# Patient Record
Sex: MALE | Race: WHITE | NOT HISPANIC OR LATINO | Employment: OTHER | URBAN - METROPOLITAN AREA
[De-identification: names, ages, dates, MRNs, and addresses within clinical notes are randomized per-mention and may not be internally consistent; named-entity substitution may affect disease eponyms.]

---

## 2021-08-16 ENCOUNTER — TRANSFERRED RECORDS (OUTPATIENT)
Dept: HEALTH INFORMATION MANAGEMENT | Facility: CLINIC | Age: 46
End: 2021-08-16

## 2023-10-18 ENCOUNTER — TRANSFERRED RECORDS (OUTPATIENT)
Dept: HEALTH INFORMATION MANAGEMENT | Facility: CLINIC | Age: 48
End: 2023-10-18

## 2023-10-18 LAB
ALT SERPL-CCNC: 23 U/L
AST SERPL-CCNC: 55 U/L (ref 17–59)
CREATININE (EXTERNAL): 1.2 MG/DL (ref 0.8–1.3)
GFR ESTIMATED (EXTERNAL): 75 ML/MIN/1.73M2 (ref 60–130)
GLUCOSE (EXTERNAL): 95 MG/DL (ref 70–99)
POTASSIUM (EXTERNAL): 5.2 MMOL/L (ref 3.5–5.1)

## 2023-10-19 ENCOUNTER — MEDICAL CORRESPONDENCE (OUTPATIENT)
Dept: HEALTH INFORMATION MANAGEMENT | Facility: CLINIC | Age: 48
End: 2023-10-19

## 2023-10-24 ENCOUNTER — TRANSCRIBE ORDERS (OUTPATIENT)
Dept: OTHER | Age: 48
End: 2023-10-24

## 2023-10-24 DIAGNOSIS — R74.8 ABNORMAL LEVELS OF OTHER SERUM ENZYMES: ICD-10-CM

## 2023-10-24 DIAGNOSIS — K70.30 ALCOHOLIC CIRRHOSIS OF LIVER WITHOUT ASCITES (H): Primary | ICD-10-CM

## 2023-11-16 NOTE — CONFIDENTIAL NOTE
DIAGNOSIS  Alcoholic cirrhosis of liver without ascites (H)   Abnormal levels of other serum enzymes   Splenomegaly      Appt Date:  02.02.2024    NOTES STATUS DETAILS   OFFICE NOTE from referring provider Received 10.25.2023 Handy Foster MD [facility]   OFFICE NOTES from other specialists     DISCHARGE SUMMARY from hospital Care Everywhere 08.14.2023 [facility]    MEDICATION LIST Received    LIVER BIOSPY (IF APPLICABLE)      PATHOLOGY REPORTS      IMAGING     ENDOSCOPY (IF AVAILABLE) Received    COLONOSCOPY (IF AVAILABLE) Received    ULTRASOUND LIVER Care Everywhere 10.30.2023 US Abd Limited    09.21.2023, 09.08.2023, 09.01.2023, 08.25.2023  US Paracentesis      08.15.2023 US Abd Doppler Complete    08.14.2023 US Abd RUQ   CT OF ABDOMEN     MRI OF LIVER Care Everywhere 08.15.2023 MR Abd    FIBROSCAN, US ELASTOGRAPHY, FIBROSIS SCAN, MR ELASTOGRAPHY     LABS     HEPATIC PANEL (LIVER PANEL) Care Everywhere 08.14.2023   BASIC METABOLIC PANEL Care Everywhere 08.18.2023   COMPLETE METABOLIC PANEL Care Everywhere 08.18.2023   COMPLETE BLOOD COUNT (CBC) Care Everywhere 08.18.2023   INTERNATIONAL NORMALIZED RATIO (INR) Care Everywhere 08.15.2023   HEPATITIS C ANTIBODY Care Everywhere 08.14.2023   HEPATITIS C VIRAL LOAD/PCR     HEPATITIS C GENOTYPE     HEPATITIS B SURFACE ANTIGEN Care Everywhere 08.14.2023   HEPATITIS B SURFACE ANTIBODY Care Everywhere 08.14.2023   HEPATITIS B DNA QUANT LEVEL     HEPATITIS B CORE ANTIBODY        Action 11.16.2023 RM   Action Taken Sent fax request for images pending     Action 12.18.2023 RM   Action Taken Images received and uploaded to chart.

## 2024-01-19 ENCOUNTER — TELEPHONE (OUTPATIENT)
Dept: GASTROENTEROLOGY | Facility: CLINIC | Age: 49
End: 2024-01-19
Payer: COMMERCIAL

## 2024-01-19 DIAGNOSIS — K70.31 ALCOHOLIC CIRRHOSIS OF LIVER WITH ASCITES (H): Primary | ICD-10-CM

## 2024-01-19 NOTE — TELEPHONE ENCOUNTER
Lab orders faxed to [facility], per patient request.    Flores RIVERA LPN  Hepatology Clinic     [facility]    Phone Message    May a detailed message be left on voicemail: yes     Reason for Call: Order(s): Other:   Reason for requested: Liver Labs  Date needed: ASAP  Provider name: Dr Sujatha Kunz    Please fax lab orders to [facility] to fax number [phone].     Action Taken: Message routed to:  [facility] Hep    Travel Screening: Not Applicable

## 2024-01-25 ENCOUNTER — TRANSFERRED RECORDS (OUTPATIENT)
Dept: HEALTH INFORMATION MANAGEMENT | Facility: CLINIC | Age: 49
End: 2024-01-25
Payer: COMMERCIAL

## 2024-01-25 LAB
ALT SERPL-CCNC: 21 U/L (ref 4–33)
AST SERPL-CCNC: 24 U/L (ref 13–39)
CREATININE (EXTERNAL): 1.4 MG/DL (ref 0.7–1.3)
GFR ESTIMATED (EXTERNAL): 62 ML/MIN/1.73M2 (ref 60–130)
GLUCOSE (EXTERNAL): 91 MG/DL (ref 70–99)
POTASSIUM (EXTERNAL): 4.9 MMOL/L (ref 3.5–5.1)

## 2024-02-02 ENCOUNTER — VIRTUAL VISIT (OUTPATIENT)
Dept: GASTROENTEROLOGY | Facility: CLINIC | Age: 49
End: 2024-02-02
Attending: STUDENT IN AN ORGANIZED HEALTH CARE EDUCATION/TRAINING PROGRAM
Payer: COMMERCIAL

## 2024-02-02 ENCOUNTER — PRE VISIT (OUTPATIENT)
Dept: GASTROENTEROLOGY | Facility: CLINIC | Age: 49
End: 2024-02-02
Payer: COMMERCIAL

## 2024-02-02 DIAGNOSIS — K70.30 ALCOHOLIC CIRRHOSIS OF LIVER WITHOUT ASCITES (H): ICD-10-CM

## 2024-02-02 DIAGNOSIS — R74.8 ABNORMAL LEVELS OF OTHER SERUM ENZYMES: ICD-10-CM

## 2024-02-02 PROCEDURE — 99205 OFFICE O/P NEW HI 60 MIN: CPT | Mod: 95 | Performed by: INTERNAL MEDICINE

## 2024-02-02 NOTE — PROGRESS NOTES
REASON FOR CONSULTATION: alcohol related cirrhosis  REFERRING PROVIDER: Dr. Handy Foster [facility].    A/P  Tesfaye Amin is a 48 year old male with decompensated alcohol related liver cirrhosis c/b ascites.    Since his initial presentation August 2023 and stopping alcohol just before that, he has improved. TB peak was 2.4, now 0.8, ascites is controlled on furosemide 40    Ascites controlled with furosemide 40. Stopped lyndsey 100 a month ago and no re accumulation. Follows low Na diet. Will get US. Ordered.  HCC screening Due. Ordered.  EV Screening Unclear if done. Will get [place] records.  Portal hypertension as manifest by thrombocytopenia, ascites     Thrombocytopenia mild, plt 126. 2/2 cirrhosis and alcohol    Tobacco use Recommended he quit. He is cutting back  Dental status Has recently seen dentist  Nutritional status Good  AUD No alcohol since July 2023. Discussed recommendation for abstinence and importance of sobriety and congratulated him on his success.    ADDENDUM  Colonoscopy and EGD reports received. 7/9/21  EGD no varices  Colonoscopy 4 polyps    RTC with DYLAN in 3-4 mo    Sujatha Kunz MD  Hepatology/Liver Transplant  Medical Director, Liver Transplantation  [facility]  Subjective  Tesfaye Amin is a 48 year old male referred for alcohol cirrhosis. Diagnosis was made August 2023 when he was admitted at [facility] for 2 mo of abdominal distention, decreased appetite and abd pn. He stopped drinking just before that.    He has not had any liver care since the hospitalization    Notes state he had ascites in about 2020 and was seen in [place]. He moved to [place] and had no further follow up.     States his fingernails are falling apart, hair is falling out, has lost muscle mass    Thinks he may feel more full than in the past but unsure if this is fluid.. No imaging since September.    Labs January 25 at [place] which he read to me from his computer  Creat 1.4  Na 136  K  4.9  Bilirubin 0.8  AST 24  ALT 21  INR 1.2  Hgb 13.9  Plt  126  WBC 8.1    AUD  Last alcohol was July 2023  Alcohol pattern had been 12 beers/d then switched to liquor 500 ml/d when abd distention precluded that volume in beer.  CD treatment: had court ordered treatment for THC in urine around 2010    Ascites  He is following a low sodium diet and high protein diet.   No para needed since Septemeber. He is on furosemide 40 and was on spironolactone 100 abut it was stopped for feeling dehydrated around 1 mo ago. Made no difference    HCC screening MRI 8/14/23 limited by ascites  EV screening no record    Risk factors for fatty liver disease    Past Medical History  Chronic LBP on opiates oxycodone 10 mg 3-4 per day and  cyclobenzaprine  Tamazepam for sleep  Finger pain with swelling, takes ibuprofen  HTN on meds in the past  GERD on omeprazole  Social History  Lives with sister who is a nurse and he is helping raise his niece with her.  Smokes 1 ppd  Family History  Uncle with alcohol related cirrhosis    ROS 10 point ROS neg other than the symptoms noted above in the HPI.  Exam  Gen Alert pleasant NAD  Resp No difficulty breathing. No cough  Skin No Jaundice  Eyes No icterus  Neuro GHOTRA  MSK no muscle wasting  Psyche Pleasant, appropriate. Well groomed.  Tesfaye Amin is a 48 year old male who is being evaluated via a billable video visit.    Video-Visit Details  Type of service:  Video Visit  Video Start Time:  Video End Time:  Originating Location (pt. Location):home  Distant Location (provider location):  [facility] [place]      Platform used for Video Visit: Zoom or Stackdriver

## 2024-02-02 NOTE — PROGRESS NOTES
"Virtual Visit Details    Type of service:  Video Visit     Originating Location (pt. Location): {video visit patient location:209835::\"Home\"}  {PROVIDER LOCATION On-site should be selected for visits conducted from your clinic location or adjoining [facility] hospital, academic office, or other nearby [facility] building. Off-site should be selected for all other provider locations, including home:26[address]}  Distant Location (provider location):  {virtual location provider:755883}  Platform used for Video Visit: {Virtual Visit Platforms:598192::\"What the Trend\"}    "

## 2024-02-02 NOTE — NURSING NOTE
Is the patient currently in the state of [place]? YES    Visit mode:VIDEO    If the visit is dropped, the patient can be reconnected by: VIDEO VISIT: Text to cell phone:   Telephone Information:   Mobile [phone]       Will anyone else be joining the visit? NO  (If patient encounters technical issues they should call [phone]    How would you like to obtain your AVS? MyChart    Are changes needed to the allergy or medication list? No    Reason for visit: Consult    Tiffany WEISS

## 2024-02-06 ENCOUNTER — TELEPHONE (OUTPATIENT)
Dept: GASTROENTEROLOGY | Facility: CLINIC | Age: 49
End: 2024-02-06
Payer: COMMERCIAL

## 2024-02-08 DIAGNOSIS — K70.30 ALCOHOLIC CIRRHOSIS OF LIVER WITHOUT ASCITES (H): Primary | ICD-10-CM

## 2024-04-25 DIAGNOSIS — K70.31 ALCOHOLIC CIRRHOSIS OF LIVER WITH ASCITES (H): Primary | ICD-10-CM

## 2024-05-06 ENCOUNTER — LAB (OUTPATIENT)
Dept: LAB | Facility: CLINIC | Age: 49
End: 2024-05-06
Payer: COMMERCIAL

## 2024-05-06 ENCOUNTER — OFFICE VISIT (OUTPATIENT)
Dept: GASTROENTEROLOGY | Facility: CLINIC | Age: 49
End: 2024-05-06
Attending: INTERNAL MEDICINE
Payer: COMMERCIAL

## 2024-05-06 VITALS
HEART RATE: 73 BPM | SYSTOLIC BLOOD PRESSURE: 131 MMHG | WEIGHT: 235 LBS | OXYGEN SATURATION: 97 % | DIASTOLIC BLOOD PRESSURE: 87 MMHG

## 2024-05-06 DIAGNOSIS — K70.31 ALCOHOLIC CIRRHOSIS OF LIVER WITH ASCITES (H): ICD-10-CM

## 2024-05-06 DIAGNOSIS — M25.50 MULTIPLE JOINT PAIN: ICD-10-CM

## 2024-05-06 DIAGNOSIS — M25.50 MULTIPLE JOINT PAIN: Primary | ICD-10-CM

## 2024-05-06 DIAGNOSIS — K70.30 ALCOHOLIC CIRRHOSIS OF LIVER WITHOUT ASCITES (H): ICD-10-CM

## 2024-05-06 DIAGNOSIS — I85.10 SECONDARY ESOPHAGEAL VARICES WITHOUT BLEEDING (H): ICD-10-CM

## 2024-05-06 LAB
ALBUMIN SERPL BCG-MCNC: 4.4 G/DL (ref 3.5–5.2)
ALP SERPL-CCNC: 106 U/L (ref 40–150)
ALT SERPL W P-5'-P-CCNC: 21 U/L (ref 0–70)
ANION GAP SERPL CALCULATED.3IONS-SCNC: 12 MMOL/L (ref 7–15)
AST SERPL W P-5'-P-CCNC: 25 U/L (ref 0–45)
BILIRUB DIRECT SERPL-MCNC: 0.29 MG/DL (ref 0–0.3)
BILIRUB SERPL-MCNC: 0.7 MG/DL
BUN SERPL-MCNC: 39.9 MG/DL (ref 6–20)
CALCIUM SERPL-MCNC: 9.6 MG/DL (ref 8.6–10)
CHLORIDE SERPL-SCNC: 103 MMOL/L (ref 98–107)
CREAT SERPL-MCNC: 1.05 MG/DL (ref 0.67–1.17)
DEPRECATED HCO3 PLAS-SCNC: 24 MMOL/L (ref 22–29)
EGFRCR SERPLBLD CKD-EPI 2021: 87 ML/MIN/1.73M2
ERYTHROCYTE [DISTWIDTH] IN BLOOD BY AUTOMATED COUNT: 13.1 % (ref 10–15)
GLUCOSE SERPL-MCNC: 180 MG/DL (ref 70–99)
HCT VFR BLD AUTO: 42.2 % (ref 40–53)
HGB BLD-MCNC: 14.8 G/DL (ref 13.3–17.7)
INR PPP: 1.37 (ref 0.85–1.15)
MCH RBC QN AUTO: 30.7 PG (ref 26.5–33)
MCHC RBC AUTO-ENTMCNC: 35.1 G/DL (ref 31.5–36.5)
MCV RBC AUTO: 88 FL (ref 78–100)
PLATELET # BLD AUTO: 98 10E3/UL (ref 150–450)
POTASSIUM SERPL-SCNC: 4.1 MMOL/L (ref 3.4–5.3)
PROT SERPL-MCNC: 7.8 G/DL (ref 6.4–8.3)
RBC # BLD AUTO: 4.82 10E6/UL (ref 4.4–5.9)
SODIUM SERPL-SCNC: 139 MMOL/L (ref 135–145)
URATE SERPL-MCNC: 7 MG/DL (ref 3.4–7)
WBC # BLD AUTO: 6.8 10E3/UL (ref 4–11)

## 2024-05-06 PROCEDURE — 99213 OFFICE O/P EST LOW 20 MIN: CPT | Performed by: PHYSICIAN ASSISTANT

## 2024-05-06 PROCEDURE — 84550 ASSAY OF BLOOD/URIC ACID: CPT | Performed by: PATHOLOGY

## 2024-05-06 PROCEDURE — 80053 COMPREHEN METABOLIC PANEL: CPT | Performed by: PATHOLOGY

## 2024-05-06 PROCEDURE — 82248 BILIRUBIN DIRECT: CPT | Performed by: PATHOLOGY

## 2024-05-06 PROCEDURE — 85027 COMPLETE CBC AUTOMATED: CPT | Performed by: PATHOLOGY

## 2024-05-06 PROCEDURE — G2211 COMPLEX E/M VISIT ADD ON: HCPCS | Performed by: PHYSICIAN ASSISTANT

## 2024-05-06 PROCEDURE — 99214 OFFICE O/P EST MOD 30 MIN: CPT | Performed by: PHYSICIAN ASSISTANT

## 2024-05-06 PROCEDURE — 36415 COLL VENOUS BLD VENIPUNCTURE: CPT | Performed by: PATHOLOGY

## 2024-05-06 PROCEDURE — 85610 PROTHROMBIN TIME: CPT | Performed by: PATHOLOGY

## 2024-05-06 RX ORDER — SPIRONOLACTONE 100 MG/1
100 TABLET, FILM COATED ORAL DAILY
COMMUNITY
Start: 2023-08-19 | End: 2024-05-10

## 2024-05-06 RX ORDER — FUROSEMIDE 40 MG
40 TABLET ORAL DAILY
COMMUNITY
Start: 2023-08-19 | End: 2024-08-18

## 2024-05-06 ASSESSMENT — PAIN SCALES - GENERAL: PAINLEVEL: NO PAIN (0)

## 2024-05-06 NOTE — NURSING NOTE
Chief Complaint   Patient presents with    RECHECK     Follow-up      Vital signs:      BP: 131/87 Pulse: 73     SpO2: 97 %       Weight: 106.6 kg (235 lb)  There is no height or weight on file to calculate BMI.      Margie Tyler CMA   5/6/2024 1:13 PM

## 2024-05-06 NOTE — PATIENT INSTRUCTIONS
Your to-do list:   - EGD in July 2024   - Ultrasound at next convenience for liver cancer screening. Repeat in six months     You have currently have decompensated cirrhosis.    [email or web address]    The reason it is decompensated is because you have had issues  - Ascites, now improved.     Sobriety from alcohol remains very important to prevent liver function/symptoms from worsening. Also, it is also important to maintain healthy weight and optimizing your blood glucose, cholesterol and blood pressure.     [email or web address]    We look at something called the MELD score and different problems related to cirrhosis (ascites, GI bleeds and confusion) to determine if liver transplantation is ever needed and the benefits outweigh the risks. We will continue to monitor labs over the years and determine if and when this is needed.     MELD 3.0: 9 at 5/6/2024 12:27 PM  MELD-Na: 11 at 5/6/2024 12:27 PM  Calculated from:  Serum Creatinine: 1.05 mg/dL at 5/6/2024 12:27 PM  Serum Sodium: 139 mmol/L (Using max of 137 mmol/L) at 5/6/2024 12:27 PM  Total Bilirubin: 0.7 mg/dL (Using min of 1 mg/dL) at 5/6/2024 12:27 PM  Serum Albumin: 4.4 g/dL (Using max of 3.5 g/dL) at 5/6/2024 12:27 PM  INR(ratio): 1.37 at 5/6/2024 12:27 PM  Age at listing (hypothetical): 49 years  Sex: Male at 5/6/2024 12:27 PM    It was a pleasure to see you at your recent visit.   Please let me know if you have any questions or concerns.     Clinic Staff - [phone] option 3     Sincerely,     Jeannie Donaldson PA-C     [address], [id number]  [place]  [address].

## 2024-05-06 NOTE — LETTER
5/6/2024         RE: Tesfaye Amin  [address]  [place] [facility] [address]        Dear Colleague,    Thank you for referring your patient, Tesfaye Amin, to the [facility] [place]. Please see a copy of my visit note below.      Hepatology Clinic note  Tesfaye Amin   Date of Birth 1975  Date of Service 5/5/2024      Hepatology Follow: cirrhosis          Assessment/plan:   Tesfaye Amin is a 49 year old male with ETOH cirrhosis complicated by history of esophogeal varices and ascites requiring paracentesis now improved with low dose diuretics. No alcohol since July 2023. MELD 3.0 9.     # HCC screening, due:   - US abdomen at next convenience     # Hx of Grade 1 EV on recent EGD:    - Repeat EGD scheduled within the next month for banding at [facility]   - Discussed starting 3.125 mg twice daily for primarily prophylaxis of GI bleed     # Hepatic encephalopathy: No history of HE or asterixis on exam     # Ascites, resolved:  - Continue 40 mg furosemide    # Follow up with PCP for optimization of medical co-morbidities (blood glucose, cholesterol, blood pressure as needed)   -Discuss joint pain with primary care provider    # History of alcohol use disorder:   - No alcohol since July 2023  - Continue absolute sobriety from alcohol:   - Encouraged CD treatment or working with individuals therapist to help  support on-going sobriety     # Follow-up in clinic in six months with myself or Dr. Ze Donaldson, PA-C   [facility] Hepatology     Total time for E/M services performed on the date of the encounter 30 minutes.  This included review of previous: clinic visits, hospital records, lab results, imaging studies, and procedural documentation. Time also includes patient visit, documentation and discussion with other providers.  The findings from this review are summarized in the above note.    -----------------------------------------------------       HPI:   Tesfaye Amin is a 49 year old male for follow up for cirrhosis    ETOH Cirrhosis   Dx: August 2023  Complicated by:   - Ascites requiring paracentesis   - No history of GI bleed or HE   EGD: 2/29/2024, Grade 1 EV   HCC: MRI 8/5/2023     Patient was last seen by Dr. Kunz on 2/2/2024.  No recent hospitalizations or ER visits.  He did have an EGD that showed grade 1 esophageal varices.  It was recommended that he follow-up for esophageal banding, currently scheduled in July 2024    Appetite is good. Weight is up about 3 lbs.   Has been taking Furosemide 40 mg   Feel like gain weight, losing muscle.   Regular bowel movements  Have a lot joint pain in Shoulder, back, feet and feet.     Patient denies jaundice, lower extremity edema, abdominal distension or confusion.  Patient also denies melena, hematochezia or hematemesis. Patient denies weight loss, fevers, sweats or chills.    Currently down 3-5 cigarettes. Last ETOH was July 2023. During COVID, increased intake (750 - 1 L). No withdrawals. Last CD treatment was about 10 years for marijuana. 25 years ago DUI. Cravings at times.  . Currently taking care of niece (9), primary caregiver.  Patient currently lives with sister.     Medical hx Surgical hx   No past medical history on file. Past Surgical History:   Procedure Laterality Date    IR PARACENTESIS  8/14/2023    IR PARACENTESIS  8/16/2023    IR PARACENTESIS  8/18/2023    IR PARACENTESIS  8/25/2023    IR PARACENTESIS  9/1/2023    IR PARACENTESIS  9/8/2023    IR PARACENTESIS  9/21/2023                 Medications:     No current outpatient medications on file.     No current facility-administered medications for this visit.            Allergies:   No Known Allergies         Social History:     Social History     Socioeconomic History    Marital status: Single     Spouse name: Not on file    Number of children: Not on file     Years of education: Not on file    Highest education level: Not on file   Occupational History    Not on file   Tobacco Use    Smoking status: Every Day     Types: Cigarettes    Smokeless tobacco: Never   Vaping Use    Vaping status: Never Used   Substance and Sexual Activity    Alcohol use: Not on file    Drug use: Not on file    Sexual activity: Not on file   Other Topics Concern    Not on file   Social History Narrative    Not on file     Social Determinants of Health     Financial Resource Strain: Not on file   Food Insecurity: Not on file   Transportation Needs: Not on file   Physical Activity: Not on file   Stress: Not on file   Social Connections: Not on file   Interpersonal Safety: Not on file   Housing Stability: Not on file            Family History:   No family history on file.         Review of Systems:   GEN: See HPI  HEENT: No change in vision or hearing, mouth sores, dysphagia, lymph nodes  Resp: No shortness of breath, coughing, hx of asthma  CV: No chest pain, palpitations, syncope   GI: See HPI  : No dysuria, history of stones, urine color    Skin: No rash; no pruritus or psoriasis  MS: No arthralgias, myalgias, joint swelling  Neuro: No memory changes, confusion, numbness    Heme: No difficulty clotting, bruising, bleeding  Psych:  No anxiety, depression, agitation          Physical Exam:   /87 (BP Location: Right arm, Cuff Size: Adult Regular)   Pulse 73   Wt 106.6 kg (235 lb)   SpO2 97%     Gen: A&Ox3, NAD, well developed  HEENT: non-icteric   Lym- no palpable lymphadenopathy  Abd: soft, NT, ND, no organomegaly.   Ext: no edema, intact pulses.   Skin: No rash, no palmar erythema, telangiectasias or jaundice  Neuro: grossly intact, no asterixis   Psych: appropriate mood and affects         Data:   Reviewed in person and significant for:    COMPARISON: Ultrasound 8/15/2023, ultrasound 8/14/2023     TECHNIQUE: Multiplanar, multisequence pre and dynamic postcontrast imaging of the  abdomen. 14 mL Gadavist given intravenously prior to postcontrast imaging.     FINDINGS:     Image quality significantly limited by body habitus and very large volume ascites.     Cirrhotic appearing liver with portosystemic collateralization including a recanalized periumbilical vein. No definite focal liver abnormality.     The spleen is enlarged, measuring 18 cm in oblique craniocaudad dimension.     The solid abdominal organs are otherwise poorly visualized but show no definite abnormality. A few renal cysts are noted.     No dilated bowel.     No suspicious bone lesion.     IMPRESSION:   1. Image quality significantly limited by body habitus and very large volume ascites, such that the examination is nearly nondiagnostic. Consider repeat imaging following serial paracentesis in an attempt to remove as much ascites as possible.   2. Cirrhotic appearing liver with portosystemic collateralization and splenomegaly. No definite focal liver abnormality within the limits of this examination.

## 2024-05-06 NOTE — PROGRESS NOTES
Hepatology Clinic note  Tesfaye Amin   Date of Birth 1975  Date of Service 5/5/2024      Hepatology Follow: cirrhosis          Assessment/plan:   Tesfaye Amin is a 49 year old male with ETOH cirrhosis complicated by history of esophogeal varices and ascites requiring paracentesis now improved with low dose diuretics. No alcohol since July 2023. MELD 3.0 9.     # HCC screening, due:   - US abdomen at next convenience     # Hx of Grade 1 EV on recent EGD:    - Repeat EGD scheduled within the next month for banding at [facility]   - Discussed starting 3.125 mg twice daily for primarily prophylaxis of GI bleed     # Hepatic encephalopathy: No history of HE or asterixis on exam     # Ascites, resolved:  - Continue 40 mg furosemide    # Follow up with PCP for optimization of medical co-morbidities (blood glucose, cholesterol, blood pressure as needed)   -Discuss joint pain with primary care provider    # History of alcohol use disorder:   - No alcohol since July 2023  - Continue absolute sobriety from alcohol:   - Encouraged CD treatment or working with individuals therapist to help  support on-going sobriety     # Follow-up in clinic in six months with myself or Dr. Ze Donaldson PA-C   [facility] Hepatology     Total time for E/M services performed on the date of the encounter 30 minutes.  This included review of previous: clinic visits, hospital records, lab results, imaging studies, and procedural documentation. Time also includes patient visit, documentation and discussion with other providers.  The findings from this review are summarized in the above note.   -----------------------------------------------------       HPI:   Tesfaye Amin is a 49 year old male for follow up for cirrhosis    ETOH Cirrhosis   Dx: August 2023  Complicated by:   - Ascites requiring paracentesis   - No history of GI bleed or HE   EGD: 2/29/2024, Grade 1 EV   HCC: MRI 8/5/2023     Patient was  last seen by Dr. Kunz on 2/2/2024.  No recent hospitalizations or ER visits.  He did have an EGD that showed grade 1 esophageal varices.  It was recommended that he follow-up for esophageal banding, currently scheduled in July 2024    Appetite is good. Weight is up about 3 lbs.   Has been taking Furosemide 40 mg   Feel like gain weight, losing muscle.   Regular bowel movements  Have a lot joint pain in Shoulder, back, feet and feet.     Patient denies jaundice, lower extremity edema, abdominal distension or confusion.  Patient also denies melena, hematochezia or hematemesis. Patient denies weight loss, fevers, sweats or chills.    Currently down 3-5 cigarettes. Last ETOH was July 2023. During COVID, increased intake (750 - 1 L). No withdrawals. Last CD treatment was about 10 years for marijuana. 25 years ago DUI. Cravings at times.  . Currently taking care of niece (9), primary caregiver.  Patient currently lives with sister.     Medical hx Surgical hx   No past medical history on file. Past Surgical History:   Procedure Laterality Date    IR PARACENTESIS  8/14/2023    IR PARACENTESIS  8/16/2023    IR PARACENTESIS  8/18/2023    IR PARACENTESIS  8/25/2023    IR PARACENTESIS  9/1/2023    IR PARACENTESIS  9/8/2023    IR PARACENTESIS  9/21/2023                 Medications:     No current outpatient medications on file.     No current facility-administered medications for this visit.            Allergies:   No Known Allergies         Social History:     Social History     Socioeconomic History    Marital status: Single     Spouse name: Not on file    Number of children: Not on file    Years of education: Not on file    Highest education level: Not on file   Occupational History    Not on file   Tobacco Use    Smoking status: Every Day     Types: Cigarettes    Smokeless tobacco: Never   Vaping Use    Vaping status: Never Used   Substance and Sexual Activity    Alcohol use: Not on file    Drug use: Not on  file    Sexual activity: Not on file   Other Topics Concern    Not on file   Social History Narrative    Not on file     Social Determinants of Health     Financial Resource Strain: Not on file   Food Insecurity: Not on file   Transportation Needs: Not on file   Physical Activity: Not on file   Stress: Not on file   Social Connections: Not on file   Interpersonal Safety: Not on file   Housing Stability: Not on file            Family History:   No family history on file.         Review of Systems:   GEN: See HPI  HEENT: No change in vision or hearing, mouth sores, dysphagia, lymph nodes  Resp: No shortness of breath, coughing, hx of asthma  CV: No chest pain, palpitations, syncope   GI: See HPI  : No dysuria, history of stones, urine color    Skin: No rash; no pruritus or psoriasis  MS: No arthralgias, myalgias, joint swelling  Neuro: No memory changes, confusion, numbness    Heme: No difficulty clotting, bruising, bleeding  Psych:  No anxiety, depression, agitation          Physical Exam:   /87 (BP Location: Right arm, Cuff Size: Adult Regular)   Pulse 73   Wt 106.6 kg (235 lb)   SpO2 97%     Gen: A&Ox3, NAD, well developed  HEENT: non-icteric   Lym- no palpable lymphadenopathy  Abd: soft, NT, ND, no organomegaly.   Ext: no edema, intact pulses.   Skin: No rash, no palmar erythema, telangiectasias or jaundice  Neuro: grossly intact, no asterixis   Psych: appropriate mood and affects         Data:   Reviewed in person and significant for:    COMPARISON: Ultrasound 8/15/2023, ultrasound 8/14/2023     TECHNIQUE: Multiplanar, multisequence pre and dynamic postcontrast imaging of the abdomen. 14 mL Gadavist given intravenously prior to postcontrast imaging.     FINDINGS:     Image quality significantly limited by body habitus and very large volume ascites.     Cirrhotic appearing liver with portosystemic collateralization including a recanalized periumbilical vein. No definite focal liver abnormality.     The  spleen is enlarged, measuring 18 cm in oblique craniocaudad dimension.     The solid abdominal organs are otherwise poorly visualized but show no definite abnormality. A few renal cysts are noted.     No dilated bowel.     No suspicious bone lesion.     IMPRESSION:   1. Image quality significantly limited by body habitus and very large volume ascites, such that the examination is nearly nondiagnostic. Consider repeat imaging following serial paracentesis in an attempt to remove as much ascites as possible.   2. Cirrhotic appearing liver with portosystemic collateralization and splenomegaly. No definite focal liver abnormality within the limits of this examination.

## 2024-05-08 ENCOUNTER — TELEPHONE (OUTPATIENT)
Dept: GASTROENTEROLOGY | Facility: CLINIC | Age: 49
End: 2024-05-08
Payer: COMMERCIAL

## 2024-05-08 NOTE — TELEPHONE ENCOUNTER
[facility] Call Center    Phone Message    May a detailed message be left on voicemail: yes     Reason for Call: Medication Question or concern regarding medication   Prescription Clarification  Name of Medication: Unknown - Low Dose Blood Pressure Medication  Prescribing Provider: Mendoza   Pharmacy: [facility] [place]   What on the order needs clarification? Pt states that at his 5/6 appt he was to be prescribed a low dose blood pressure medication. Per pt the pharmacy has not received the order and writer does not see it in Epic either. Please review and call pt back to further advise. Thank you!      Action Taken: Message routed to:  [facility] ([facility]):  [facility]PATOLOG[facility]ULT [facility] [[id number]]    Travel Screening: Not Applicable

## 2024-05-09 RX ORDER — CARVEDILOL 3.12 MG/1
3.12 TABLET ORAL 2 TIMES DAILY WITH MEALS
Qty: 60 TABLET | Refills: 0 | Status: SHIPPED | OUTPATIENT
Start: 2024-05-09 | End: 2024-05-31

## 2024-05-09 NOTE — TELEPHONE ENCOUNTER
Returned patient's call.  left. [employer] message sent.    KALI BrewerN, RN, PHN  Hepatology Clinic  [facility]  [facility]

## 2024-05-19 ENCOUNTER — HEALTH MAINTENANCE LETTER (OUTPATIENT)
Age: 49
End: 2024-05-19

## 2024-05-31 ENCOUNTER — MYC REFILL (OUTPATIENT)
Dept: GASTROENTEROLOGY | Facility: CLINIC | Age: 49
End: 2024-05-31
Payer: COMMERCIAL

## 2024-05-31 DIAGNOSIS — K70.30 ALCOHOLIC CIRRHOSIS OF LIVER WITHOUT ASCITES (H): ICD-10-CM

## 2024-05-31 RX ORDER — CARVEDILOL 3.12 MG/1
3.12 TABLET ORAL 2 TIMES DAILY WITH MEALS
Qty: 60 TABLET | Refills: 11 | Status: SHIPPED | OUTPATIENT
Start: 2024-05-31 | End: 2024-05-31

## 2024-05-31 RX ORDER — CARVEDILOL 3.12 MG/1
3.12 TABLET ORAL 2 TIMES DAILY WITH MEALS
Qty: 60 TABLET | Refills: 11 | Status: SHIPPED | OUTPATIENT
Start: 2024-05-31

## 2024-09-12 ENCOUNTER — ANCILLARY PROCEDURE (OUTPATIENT)
Dept: ULTRASOUND IMAGING | Facility: CLINIC | Age: 49
End: 2024-09-12
Attending: PHYSICIAN ASSISTANT
Payer: COMMERCIAL

## 2024-09-12 DIAGNOSIS — K70.30 ALCOHOLIC CIRRHOSIS OF LIVER WITHOUT ASCITES (H): ICD-10-CM

## 2024-09-12 PROCEDURE — 76705 ECHO EXAM OF ABDOMEN: CPT | Mod: GC | Performed by: RADIOLOGY

## 2025-01-06 ENCOUNTER — LAB (OUTPATIENT)
Dept: LAB | Facility: CLINIC | Age: 50
End: 2025-01-06
Attending: PHYSICIAN ASSISTANT
Payer: COMMERCIAL

## 2025-01-06 DIAGNOSIS — I85.10 SECONDARY ESOPHAGEAL VARICES WITHOUT BLEEDING (H): ICD-10-CM

## 2025-01-06 DIAGNOSIS — K70.30 ALCOHOLIC CIRRHOSIS OF LIVER WITHOUT ASCITES (H): ICD-10-CM

## 2025-01-06 LAB
AFP SERPL-MCNC: 1.9 NG/ML
ALBUMIN SERPL BCG-MCNC: 4.6 G/DL (ref 3.5–5.2)
ALP SERPL-CCNC: 95 U/L (ref 40–150)
ALT SERPL W P-5'-P-CCNC: 35 U/L (ref 0–70)
ANION GAP SERPL CALCULATED.3IONS-SCNC: 12 MMOL/L (ref 7–15)
AST SERPL W P-5'-P-CCNC: 28 U/L (ref 0–45)
BILIRUB DIRECT SERPL-MCNC: 0.24 MG/DL (ref 0–0.3)
BILIRUB SERPL-MCNC: 0.7 MG/DL
BUN SERPL-MCNC: 29.5 MG/DL (ref 6–20)
CALCIUM SERPL-MCNC: 9.5 MG/DL (ref 8.8–10.4)
CHLORIDE SERPL-SCNC: 103 MMOL/L (ref 98–107)
CREAT SERPL-MCNC: 1.02 MG/DL (ref 0.67–1.17)
EGFRCR SERPLBLD CKD-EPI 2021: 90 ML/MIN/1.73M2
ERYTHROCYTE [DISTWIDTH] IN BLOOD BY AUTOMATED COUNT: 13.4 % (ref 10–15)
GLUCOSE SERPL-MCNC: 104 MG/DL (ref 70–99)
HCO3 SERPL-SCNC: 25 MMOL/L (ref 22–29)
HCT VFR BLD AUTO: 45.1 % (ref 40–53)
HGB BLD-MCNC: 16.2 G/DL (ref 13.3–17.7)
INR PPP: 1.17 (ref 0.85–1.15)
MCH RBC QN AUTO: 30.2 PG (ref 26.5–33)
MCHC RBC AUTO-ENTMCNC: 35.9 G/DL (ref 31.5–36.5)
MCV RBC AUTO: 84 FL (ref 78–100)
PLATELET # BLD AUTO: 119 10E3/UL (ref 150–450)
POTASSIUM SERPL-SCNC: 4.1 MMOL/L (ref 3.4–5.3)
PROT SERPL-MCNC: 7.9 G/DL (ref 6.4–8.3)
RBC # BLD AUTO: 5.36 10E6/UL (ref 4.4–5.9)
SODIUM SERPL-SCNC: 140 MMOL/L (ref 135–145)
WBC # BLD AUTO: 8.1 10E3/UL (ref 4–11)

## 2025-01-06 PROCEDURE — 36415 COLL VENOUS BLD VENIPUNCTURE: CPT | Performed by: PATHOLOGY

## 2025-01-06 PROCEDURE — 82105 ALPHA-FETOPROTEIN SERUM: CPT | Performed by: PHYSICIAN ASSISTANT

## 2025-01-06 PROCEDURE — 85027 COMPLETE CBC AUTOMATED: CPT | Performed by: PATHOLOGY

## 2025-01-06 PROCEDURE — 85610 PROTHROMBIN TIME: CPT | Performed by: PATHOLOGY

## 2025-01-06 PROCEDURE — 80053 COMPREHEN METABOLIC PANEL: CPT | Performed by: PATHOLOGY

## 2025-01-06 PROCEDURE — 99000 SPECIMEN HANDLING OFFICE-LAB: CPT | Performed by: PATHOLOGY

## 2025-01-06 PROCEDURE — 82248 BILIRUBIN DIRECT: CPT | Performed by: PATHOLOGY

## 2025-01-31 ENCOUNTER — OFFICE VISIT (OUTPATIENT)
Dept: GASTROENTEROLOGY | Facility: CLINIC | Age: 50
End: 2025-01-31
Attending: PHYSICIAN ASSISTANT
Payer: COMMERCIAL

## 2025-01-31 VITALS
BODY MASS INDEX: 38.43 KG/M2 | HEART RATE: 75 BPM | HEIGHT: 73 IN | DIASTOLIC BLOOD PRESSURE: 99 MMHG | SYSTOLIC BLOOD PRESSURE: 148 MMHG | OXYGEN SATURATION: 97 % | WEIGHT: 290 LBS

## 2025-01-31 DIAGNOSIS — K70.30 ALCOHOLIC CIRRHOSIS OF LIVER WITHOUT ASCITES (H): ICD-10-CM

## 2025-01-31 PROCEDURE — G2211 COMPLEX E/M VISIT ADD ON: HCPCS | Performed by: PHYSICIAN ASSISTANT

## 2025-01-31 PROCEDURE — 99213 OFFICE O/P EST LOW 20 MIN: CPT | Performed by: PHYSICIAN ASSISTANT

## 2025-01-31 PROCEDURE — 99214 OFFICE O/P EST MOD 30 MIN: CPT | Performed by: PHYSICIAN ASSISTANT

## 2025-01-31 ASSESSMENT — PAIN SCALES - GENERAL: PAINLEVEL_OUTOF10: MODERATE PAIN (5)

## 2025-01-31 NOTE — Clinical Note
1/31/2025      Tesfaye Amin  [address]  [facility] [address]      Dear Colleague,    Thank you for referring your patient, Tesfaye Amin, to the [facility] [place]. Please see a copy of my visit note below.    Hepatology Follow-up Clinic note  Tesfaye Amin   Date of Birth 1975  Reason for follow-up: Cirrhosis          Assessment/plan:   Tesfaye Amin is a 49 year old male with ETOH cirrhosis complicated by history of esophogeal varices and ascites requiring paracentesis now improved with low dose diuretics. No alcohol since July 2023. MELD 3.0 9.      # HCC screening, due:   - US abdomen at next convenience      # Variceal screening, up to date:   - Repeat EGD in 2027   - Increase to 6.25 mg twice daily for primarily prophylaxis of GI bleed      # Hepatic encephalopathy: No history of HE or asterixis on exam      # Ascites, resolved:  - Continue 40 mg furosemide     # Follow up with PCP for optimization of medical co-morbidities (blood glucose, cholesterol, blood pressure as needed)        # History of alcohol use disorder:   - No alcohol since July 2023  - Continue absolute sobriety from alcohol:   - Encouraged CD treatment or working with individuals therapist to help  support on-going sobriety     # Establish care with PCP         Jeannie Donaldson PA-C   [facility] Hepatology clinic    Total time for E/M services performed on the date of the encounter *** minutes.  This included review of previous: clinic visits, hospital records, lab results, imaging studies, and procedural documentation. Time also includes patient visit, documentation and discussion with other providers.  The findings from this review are summarized in the above note.    -----------------------------------------------------       HPI:   Tesfaye Amin is a 49 year old male presenting for follow-up.     ETOH Cirrhosis   Dx: August 2023  Complicated by:   - Ascites requiring  paracentesis   - No history of GI bleed or HE   EGD: 7/31/2024:  normal esophagus    HCC: MRI 8/5/2023        Patient was last seen by me on 5/6/2024.   Recent hospitalizations or ER visits:   New medications: ***     Appetite is good.     40 mg furosemide  4 TBPP miralax seems to be a good BM.     Wait   October - X-rays arthritis   Started Allopurinol.   Fluid in legs.     124-137/ 87's.     Patient denies jaundice, lower extremity edema, abdominal distension or confusion.    Patient also denies melena, hematochezia or hematemesis.  Patient denies weight loss, fevers, sweats or chills.    Last ETOH was July 2023. During COVID, increased intake (750 - 1 L).  [facility]ent [facility], for dental work. Extraction, implants, 20 caps and .   A month after came back [place].   Feel sweaty easily.     Turned a corner within the past few weeks.    No alcohol.   Quick smoking in July 2024.   Using nicotine pouches, slowly cutting down that.     Recent Labs   Lab Test 01/06/25  1312 05/06/24  1227   ALKPHOS 95 106   ALT 35 21   AST 28 25   BILITOTAL 0.7 0.7           Medications:     Current Outpatient Medications   Medication Sig Dispense Refill    carvedilol (COREG) 3.125 MG tablet Take 1 tablet (3.125 mg) by mouth 2 times daily (with meals) for portal hypertension 60 tablet 11    furosemide (LASIX) 40 MG tablet Take 40 mg by mouth daily       No current facility-administered medications for this visit.            Review of Systems:   10 points ROS was obtained and highlighted in the HPI, otherwise negative.          Physical Exam:   VS: There were no vitals taken for this visit.      Gen: A&Ox3, NAD, well developed  HEENT: non-icteric   CV: RRR, no overt murmurs  Lung: CTA Bilatererally, no wheezing or crackles.   Lym- no palpable lymphadenopathy  Abd: soft, NT, ND, no organomegaly.   Ext: no edema, intact pulses.   Skin: No rash, no palmar erythema, telangiectasias or jaundice  Neuro: grossly intact, no asterixis   Psych:  "appropriate mood and affects         Data:   Reviewed in person and significant for:    Lab Results   Component Value Date     01/06/2025      Lab Results   Component Value Date    POTASSIUM 4.1 01/06/2025     Lab Results   Component Value Date    CHLORIDE 103 01/06/2025     Lab Results   Component Value Date    CO2 25 01/06/2025     Lab Results   Component Value Date    BUN 29.5 01/06/2025     Lab Results   Component Value Date    CR 1.02 01/06/2025       Lab Results   Component Value Date    WBC 8.1 01/06/2025     Lab Results   Component Value Date    HGB 16.2 01/06/2025     Lab Results   Component Value Date    HCT 45.1 01/06/2025     Lab Results   Component Value Date    MCV 84 01/06/2025     Lab Results   Component Value Date     01/06/2025       Lab Results   Component Value Date    AST 28 01/06/2025     Lab Results   Component Value Date    ALT 35 01/06/2025     No results found for: \"BILICONJ\"   Lab Results   Component Value Date    BILITOTAL 0.7 01/06/2025       Lab Results   Component Value Date    ALBUMIN 4.6 01/06/2025     Lab Results   Component Value Date    PROTTOTAL 7.9 01/06/2025      Lab Results   Component Value Date    ALKPHOS 95 01/06/2025       Lab Results   Component Value Date    INR 1.17 01/06/2025     EXAMINATION: US ABDOMEN COMPLETE,  9/12/2024 8:04 AM      COMPARISON: Abdomen ultrasound and abdomen MRI 8/15/2023 .     HISTORY: Alcoholic cirrhosis of liver without ascites (H)     TECHNIQUE: The abdomen was scanned in standard fashion with  specialized ultrasound transducer(s) using both gray-scale and limited  color Doppler techniques.     FINDINGS:  Liver: The liver measures 20.7 cm and demonstrates coarsened  echotexture with nodular surface contours. No evidence of a focal  hepatic mass. The main portal vein is patent with antegrade flow.  Recanalized paraumbilical vein.     Gallbladder: Gallbladder folds.  There is no wall thickening,  pericholecystic fluid, positive " sonographic Ivy's sign or evidence  for cholelithiasis.     Bile Ducts: Both the intra- and extrahepatic biliary system are of  normal caliber.  The common bile duct measures 4 millimeter in  diameter.     Pancreas: Visualized portions of the head and body of the pancreas are  unremarkable.      Right Kidney: Measures 12.8 cm with normal echotexture, without mass  or hydronephrosis.   0.7 x 0.9 x 0.6 cm exophytic, benign-appearing  cystic appearing lesion in the inferior pole.     Aorta and IVC: The visualized portions of the aorta and IVC are  unremarkable. The proximal aorta measures 2.8 cm in diameter.     Fluid: Mild ascites.                                                                      IMPRESSION:   1.  Hepatic cirrhosis with hepatomegaly and ascites.     I have personally reviewed the examination and initial interpretation  and I agree with the findings.     SUNI HARRISON MD       Again, thank you for allowing me to participate in the care of your patient.        Sincerely,        Jeannie Donaldson PA-C    Electronically signed

## 2025-01-31 NOTE — PROGRESS NOTES
Hepatology Follow-up Clinic note  Tesfaye Amin   Date of Birth 1975  Reason for follow-up: Cirrhosis          Assessment/plan:   Tesfaye Amin is a 49 year old male with ETOH cirrhosis complicated by history of esophogeal varices and ascites requiring paracentesis now improved with low dose diuretics. No alcohol since July 2023. MELD 3.0 9.      # HCC screening, due:   - US abdomen at next convenience      # Variceal screening, up to date:   - Repeat EGD in 2027   - Increase to 6.25 mg twice daily for primarily prophylaxis of GI bleed      # Hepatic encephalopathy: No history of HE or asterixis on exam      # Ascites, resolved:  - Continue 40 mg furosemide     # Follow up with PCP for optimization of medical co-morbidities (blood glucose, cholesterol, blood pressure as needed)        # History of alcohol use disorder:   - No alcohol since July 2023  - Continue absolute sobriety from alcohol:   - Encouraged CD treatment or working with individuals therapist to help  support on-going sobriety     # Establish care with PCP         Jeannie Donaldson PA-C   [facility] Hepatology clinic    Total time for E/M services performed on the date of the encounter *** minutes.  This included review of previous: clinic visits, hospital records, lab results, imaging studies, and procedural documentation. Time also includes patient visit, documentation and discussion with other providers.  The findings from this review are summarized in the above note.    -----------------------------------------------------       HPI:   Tesfaye Amin is a 49 year old male presenting for follow-up.     ETOH Cirrhosis   Dx: August 2023  Complicated by:   - Ascites requiring paracentesis   - No history of GI bleed or HE   EGD: 7/31/2024:  normal esophagus    HCC: MRI 8/5/2023        Patient was last seen by me on 5/6/2024.   Recent hospitalizations or ER visits:   New medications: ***     Appetite is good.     40 mg furosemide  4  TBPP miralax seems to be a good BM.     Wait   October - X-rays arthritis   Started Allopurinol.   Fluid in legs.     124-137/ 87's.     Patient denies jaundice, lower extremity edema, abdominal distension or confusion.    Patient also denies melena, hematochezia or hematemesis.  Patient denies weight loss, fevers, sweats or chills.    Last ETOH was July 2023. During COVID, increased intake (750 - 1 L).  Went to [facility], for dental work. Extraction, implants, 20 caps and .   A month after came back [place].   Feel sweaty easily.     Turned a corner within the past few weeks.    No alcohol.   Quick smoking in July 2024.   Using nicotine pouches, slowly cutting down that.     Recent Labs   Lab Test 01/06/25  1312 05/06/24  1227   ALKPHOS 95 106   ALT 35 21   AST 28 25   BILITOTAL 0.7 0.7           Medications:     Current Outpatient Medications   Medication Sig Dispense Refill    carvedilol (COREG) 3.125 MG tablet Take 1 tablet (3.125 mg) by mouth 2 times daily (with meals) for portal hypertension 60 tablet 11    furosemide (LASIX) 40 MG tablet Take 40 mg by mouth daily       No current facility-administered medications for this visit.            Review of Systems:   10 points ROS was obtained and highlighted in the HPI, otherwise negative.          Physical Exam:   VS: There were no vitals taken for this visit.      Gen: A&Ox3, NAD, well developed  HEENT: non-icteric   CV: RRR, no overt murmurs  Lung: CTA Bilatererally, no wheezing or crackles.   Lym- no palpable lymphadenopathy  Abd: soft, NT, ND, no organomegaly.   Ext: no edema, intact pulses.   Skin: No rash, no palmar erythema, telangiectasias or jaundice  Neuro: grossly intact, no asterixis   Psych: appropriate mood and affects         Data:   Reviewed in person and significant for:    Lab Results   Component Value Date     01/06/2025      Lab Results   Component Value Date    POTASSIUM 4.1 01/06/2025     Lab Results   Component Value Date     "CHLORIDE 103 01/06/2025     Lab Results   Component Value Date    CO2 25 01/06/2025     Lab Results   Component Value Date    BUN 29.5 01/06/2025     Lab Results   Component Value Date    CR 1.02 01/06/2025       Lab Results   Component Value Date    WBC 8.1 01/06/2025     Lab Results   Component Value Date    HGB 16.2 01/06/2025     Lab Results   Component Value Date    HCT 45.1 01/06/2025     Lab Results   Component Value Date    MCV 84 01/06/2025     Lab Results   Component Value Date     01/06/2025       Lab Results   Component Value Date    AST 28 01/06/2025     Lab Results   Component Value Date    ALT 35 01/06/2025     No results found for: \"BILICONJ\"   Lab Results   Component Value Date    BILITOTAL 0.7 01/06/2025       Lab Results   Component Value Date    ALBUMIN 4.6 01/06/2025     Lab Results   Component Value Date    PROTTOTAL 7.9 01/06/2025      Lab Results   Component Value Date    ALKPHOS 95 01/06/2025       Lab Results   Component Value Date    INR 1.17 01/06/2025     EXAMINATION: US ABDOMEN COMPLETE,  9/12/2024 8:04 AM      COMPARISON: Abdomen ultrasound and abdomen MRI 8/15/2023 .     HISTORY: Alcoholic cirrhosis of liver without ascites (H)     TECHNIQUE: The abdomen was scanned in standard fashion with  specialized ultrasound transducer(s) using both gray-scale and limited  color Doppler techniques.     FINDINGS:  Liver: The liver measures 20.7 cm and demonstrates coarsened  echotexture with nodular surface contours. No evidence of a focal  hepatic mass. The main portal vein is patent with antegrade flow.  Recanalized paraumbilical vein.     Gallbladder: Gallbladder folds.  There is no wall thickening,  pericholecystic fluid, positive sonographic Ivy's sign or evidence  for cholelithiasis.     Bile Ducts: Both the intra- and extrahepatic biliary system are of  normal caliber.  The common bile duct measures 4 millimeter in  diameter.     Pancreas: Visualized portions of the head and body " of the pancreas are  unremarkable.      Right Kidney: Measures 12.8 cm with normal echotexture, without mass  or hydronephrosis.   0.7 x 0.9 x 0.6 cm exophytic, benign-appearing  cystic appearing lesion in the inferior pole.     Aorta and IVC: The visualized portions of the aorta and IVC are  unremarkable. The proximal aorta measures 2.8 cm in diameter.     Fluid: Mild ascites.                                                                      IMPRESSION:   1.  Hepatic cirrhosis with hepatomegaly and ascites.     I have personally reviewed the examination and initial interpretation  and I agree with the findings.     SUNI HARRISON MD

## 2025-01-31 NOTE — NURSING NOTE
"Chief Complaint   Patient presents with    RECHECK     Alcoholic cirrhosis of liver without ascites     BP (!) 148/99 (BP Location: Right arm, Patient Position: Sitting)   Pulse 75   Ht 1.854 m (6' 1\")   Wt 131.5 kg (290 lb)   SpO2 97%   BMI 38.26 kg/m    Farhana Cline CMA on 1/31/2025 at 2:02 PM    "

## 2025-02-08 RX ORDER — ALLOPURINOL 100 MG/1
100 TABLET ORAL DAILY
COMMUNITY
Start: 2025-01-13

## 2025-03-03 ENCOUNTER — ANCILLARY PROCEDURE (OUTPATIENT)
Dept: ULTRASOUND IMAGING | Facility: CLINIC | Age: 50
End: 2025-03-03
Attending: PHYSICIAN ASSISTANT
Payer: COMMERCIAL

## 2025-03-03 DIAGNOSIS — K70.30 ALCOHOLIC CIRRHOSIS OF LIVER WITHOUT ASCITES (H): ICD-10-CM

## 2025-03-03 PROCEDURE — 76700 US EXAM ABDOM COMPLETE: CPT | Performed by: RADIOLOGY

## 2025-03-11 ENCOUNTER — MYC MEDICAL ADVICE (OUTPATIENT)
Dept: GASTROENTEROLOGY | Facility: CLINIC | Age: 50
End: 2025-03-11
Payer: COMMERCIAL

## 2025-06-08 ENCOUNTER — HEALTH MAINTENANCE LETTER (OUTPATIENT)
Age: 50
End: 2025-06-08